# Patient Record
Sex: MALE | Race: BLACK OR AFRICAN AMERICAN | URBAN - METROPOLITAN AREA
[De-identification: names, ages, dates, MRNs, and addresses within clinical notes are randomized per-mention and may not be internally consistent; named-entity substitution may affect disease eponyms.]

---

## 2018-02-05 ENCOUNTER — OFFICE VISIT (OUTPATIENT)
Dept: FAMILY MEDICINE CLINIC | Age: 43
End: 2018-02-05

## 2018-02-05 VITALS
DIASTOLIC BLOOD PRESSURE: 87 MMHG | HEIGHT: 72 IN | WEIGHT: 271 LBS | TEMPERATURE: 98.7 F | OXYGEN SATURATION: 98 % | SYSTOLIC BLOOD PRESSURE: 154 MMHG | RESPIRATION RATE: 17 BRPM | BODY MASS INDEX: 36.7 KG/M2 | HEART RATE: 88 BPM

## 2018-02-05 DIAGNOSIS — Z29.9 PREVENTIVE MEASURE: ICD-10-CM

## 2018-02-05 DIAGNOSIS — Z72.0 TOBACCO ABUSE: ICD-10-CM

## 2018-02-05 DIAGNOSIS — Z80.0 FAMILY HISTORY OF RECTAL CANCER: ICD-10-CM

## 2018-02-05 DIAGNOSIS — Z86.39 HISTORY OF HYPOGONADISM: ICD-10-CM

## 2018-02-05 DIAGNOSIS — E66.9 OBESITY, UNSPECIFIED CLASSIFICATION, UNSPECIFIED OBESITY TYPE, UNSPECIFIED WHETHER SERIOUS COMORBIDITY PRESENT: ICD-10-CM

## 2018-02-05 DIAGNOSIS — I10 ESSENTIAL HYPERTENSION: Primary | ICD-10-CM

## 2018-02-05 RX ORDER — LOSARTAN POTASSIUM 50 MG/1
50 TABLET ORAL DAILY
Qty: 30 TAB | Refills: 0 | Status: CANCELLED | OUTPATIENT
Start: 2018-02-05

## 2018-02-05 RX ORDER — LOSARTAN POTASSIUM 50 MG/1
TABLET ORAL DAILY
COMMUNITY

## 2018-02-05 NOTE — PROGRESS NOTES
Chief Complaint   Patient presents with    New Patient     1. Have you been to the ER, urgent care clinic since your last visit? Hospitalized since your last visit? No    2. Have you seen or consulted any other health care providers outside of the [address] [place] [address] since your last visit? Include any pap smears or colon screening.  No

## 2018-02-05 NOTE — PROGRESS NOTES
History and Physical    Patient: Michael Buck MRN: [id number]  SSN: [id number]    YOB: 1975  Age: 43 y.o. Sex: male      Subjective:      Michael Buck is a 43 y.o. male who is here to establish care. The patient has been out of touch with his PCP Dr. Wiliam Stark with [facility]. 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 He states that he was about 25years of age when he was diagnosed with hypertension. The patient was later prescribed losartan by his doctor. The patient mentions that he was not that consistent in taking his meds of late. The patient denies any headaches or blurred vision since being off of his medications. He also states that he never had any side effects from his medications. The patient mentions that he has been treated for low testosterone. He states that he was previously treated with Androgel. He states that is interested in starting back on testosterone. He states that he is constantly fatigued and has a problem concentrating. He states that the last time he had T therapy was around 2016. Past Medical History:   Diagnosis Date    Hypercholesterolemia     Hypertension      No past surgical history on file. Denies     Family History   Problem Relation Age of Onset    Cancer Father      Rectal (diagnosed 5-7 years ago)    [facility] Heart Disease Father     Heart Attack Father     Stroke Father     Hypertension Father     Hypertension Sister      Social History   Substance Use Topics    Smoking status: Current Every Day Smoker (3 cigarettes a day)    Smokeless tobacco: Never Used    Alcohol use 10.8 oz/week     18 Cans of beer per week (12-18 cans a week of alcohol)    -   - Denies illicit drugs   [facility]        Prior to Admission medications    Medication Sig Start Date End Date Taking? Authorizing Provider   losartan (COZAAR) 50 mg tablet Take  by mouth daily.     Historical Provider        No Known Allergies    Review of Systems:  Constitutional: negative  Eyes: negative  Ears, Nose, Mouth, Throat, and Face: negative  Respiratory: negative  Cardiovascular: negative  Gastrointestinal: negative  Genitourinary:positive for sexual problems  Integument/Breast: negative  Hematologic/Lymphatic: negative  Musculoskeletal:negative  Neurological: negative  Behavioral/Psychiatric: negative    Objective:     Vitals:    02/05/18 1522   BP: 154/87   Pulse: 88   Resp: 17   Temp: 98.7 °F (37.1 °C)   TempSrc: Oral   SpO2: 98%   Weight: 271 lb (122.9 kg)   Height: 6' (1.829 m)        Physical Exam:  GENERAL: alert, cooperative, no distress, appears stated age, mildly obese  EYE: conjunctivae/corneas clear. PERRL, EOM's intact. Fundi benign  LYMPHATIC: Cervical, supraclavicular, and axillary nodes normal.   THROAT & NECK: normal and no erythema or exudates noted. LUNG: clear to auscultation bilaterally  HEART: regular rate and rhythm, S1, S2 normal, no murmur, click, rub or gallop  ABDOMEN: soft, non-tender. Bowel sounds normal. No masses,  no organomegaly, abnormal findings:  obese  EXTREMITIES:  extremities normal, atraumatic, no cyanosis or edema  SKIN: Normal.  NEUROLOGIC: negative  PSYCHIATRIC: non focal      Labs:   Labs ordered as noted below     Assessment:     1.) Essential Hypertension: Patient was advised to watch diet and to keep track of his blood pressures. Goal is for BP at 130/80 or less    2.) Tobacco Abuse: Counseling provided to the patient about the risks of     3.) Family History of Rectal Cancer: CEA and  ordered. 4.) History of Hypogonadism: testosterone level ordered. 5.) Tobacco Abuse: Patient provided with education regarding the importance of quitting smoking.     6.) Preventive: Labs ordered as noted below. Patient will return in approximately 2 weeks for follow up.        Plan:     Orders Placed This Encounter    SED RATE (ESR)    CBC WITH AUTOMATED DIFF    METABOLIC PANEL, COMPREHENSIVE    LIPID PANEL    TSH 3RD GENERATION    T4, FREE    VITAMIN D, 25 HYDROXY    HEMOGLOBIN A1C WITH EAG    HIV 1/2 AG/AB, 4TH GENERATION,W RFLX CONFIRM    TESTOSTERONE, FREE & TOTAL    CEA        losartan (COZAAR) 50 mg tablet                 Signed By: Joe Espinoza DO     February 5, 2018

## 2018-02-05 NOTE — PATIENT INSTRUCTIONS
Please contact our office if you have any questions about your visit today. 1.) Please check blood pressures every day until I see you on February 22nd. 2.) Please get labs before next visit. You can walk in for this. 3.) Return on February 22nd and bring blood pressure log with you. DASH Diet: Care Instructions  Your Care Instructions    The DASH diet is an eating plan that can help lower your blood pressure. DASH stands for Dietary Approaches to Stop Hypertension. Hypertension is high blood pressure. The DASH diet focuses on eating foods that are high in calcium, potassium, and magnesium. These nutrients can lower blood pressure. The foods that are highest in these nutrients are fruits, vegetables, low-fat dairy products, nuts, seeds, and legumes. But taking calcium, potassium, and magnesium supplements instead of eating foods that are high in those nutrients does not have the same effect. The DASH diet also includes whole grains, fish, and poultry. The DASH diet is one of several lifestyle changes your doctor may recommend to lower your high blood pressure. Your doctor may also want you to decrease the amount of sodium in your diet. Lowering sodium while following the DASH diet can lower blood pressure even further than just the DASH diet alone. Follow-up care is a key part of your treatment and safety. Be sure to make and go to all appointments, and call your doctor if you are having problems. It's also a good idea to know your test results and keep a list of the medicines you take. How can you care for yourself at home? Following the DASH diet  · Eat 4 to 5 servings of fruit each day. A serving is 1 medium-sized piece of fruit, ½ cup chopped or canned fruit, 1/4 cup dried fruit, or 4 ounces (½ cup) of fruit juice. Choose fruit more often than fruit juice. · Eat 4 to 5 servings of vegetables each day.  A serving is 1 cup of lettuce or raw leafy vegetables, ½ cup of chopped or cooked vegetables, or 4 ounces (½ cup) of vegetable juice. Choose vegetables more often than vegetable juice. · Get 2 to 3 servings of low-fat and fat-free dairy each day. A serving is 8 ounces of milk, 1 cup of yogurt, or 1 ½ ounces of cheese. · Eat 6 to 8 servings of grains each day. A serving is 1 slice of bread, 1 ounce of dry cereal, or ½ cup of cooked rice, pasta, or cooked cereal. Try to choose whole-grain products as much as possible. · Limit lean meat, poultry, and fish to 2 servings each day. A serving is 3 ounces, about the size of a deck of cards. · Eat 4 to 5 servings of nuts, seeds, and legumes (cooked dried beans, lentils, and split peas) each week. A serving is 1/3 cup of nuts, 2 tablespoons of seeds, or ½ cup of cooked beans or peas. · Limit fats and oils to 2 to 3 servings each day. A serving is 1 teaspoon of vegetable oil or 2 tablespoons of salad dressing. · Limit sweets and added sugars to 5 servings or less a week. A serving is 1 tablespoon jelly or jam, ½ cup sorbet, or 1 cup of lemonade. · Eat less than 2,300 milligrams (mg) of sodium a day. If you limit your sodium to 1,500 mg a day, you can lower your blood pressure even more. Tips for success  · Start small. Do not try to make dramatic changes to your diet all at once. You might feel that you are missing out on your favorite foods and then be more likely to not follow the plan. Make small changes, and stick with them. Once those changes become habit, add a few more changes. · Try some of the following:  ¨ Make it a goal to eat a fruit or vegetable at every meal and at snacks. This will make it easy to get the recommended amount of fruits and vegetables each day. ¨ Try yogurt topped with fruit and nuts for a snack or healthy dessert. ¨ Add lettuce, tomato, cucumber, and onion to sandwiches. ¨ Combine a ready-made pizza crust with low-fat mozzarella cheese and lots of vegetable toppings.  Try using tomatoes, squash, spinach, broccoli, carrots, cauliflower, and onions. ¨ Have a variety of cut-up vegetables with a low-fat dip as an appetizer instead of chips and dip. ¨ Sprinkle sunflower seeds or chopped almonds over salads. Or try adding chopped walnuts or almonds to cooked vegetables. ¨ Try some vegetarian meals using beans and peas. Add garbanzo or kidney beans to salads. Make burritos and tacos with mashed alegre beans or black beans. Where can you learn more? Go to http://[employer]rosita.info/. Enter I473 in the search box to learn more about \"DASH Diet: Care Instructions. \"  Current as of: September 21, 2016  Content Version: 11.4  © 7944-8691 [employer]. Care instructions adapted under license by [employer] (which disclaims liability or warranty for this information). If you have questions about a medical condition or this instruction, always ask your healthcare professional. [place] [address] any warranty or liability for your use of this information. Learning About Benefits From Quitting Smoking  How does quitting smoking make you healthier? If you're thinking about quitting smoking, you may have a few reasons to be smoke-free. Your health may be one of them. · When you quit smoking, you lower your risks for cancer, lung disease, heart attack, stroke, blood vessel disease, and blindness from macular degeneration. · When you're smoke-free, you get sick less often, and you heal faster. You are less likely to get colds, flu, bronchitis, and pneumonia. · As a nonsmoker, you may find that your mood is better and you are less stressed. When and how will you feel healthier? Quitting has real health benefits that start from day 1 of being smoke-free. And the longer you stay smoke-free, the healthier you get and the better you feel. The first hours  · After just 20 minutes, your blood pressure and heart rate go down.  That means there's less stress on your heart and blood vessels. · Within 12 hours, the level of carbon monoxide in your blood drops back to normal. That makes room for more oxygen. With more oxygen in your body, you may notice that you have more energy than when you smoked. After 2 weeks  · Your lungs start to work better. · Your risk of heart attack starts to drop. After 1 month  · When your lungs are clear, you cough less and breathe deeper, so it's easier to be active. · Your sense of taste and smell return. That means you can enjoy food more than you have since you started smoking. Over the years  · After 1 year, your risk of heart disease is half what it would be if you kept smoking. · After 5 years, your risk of stroke starts to shrink. Within a few years after that, it's about the same as if you'd never smoked. · After 10 years, your risk of dying from lung cancer is cut by about half. And your risk for many other types of cancer is lower too. How would quitting help others in your life? When you quit smoking, you improve the health of everyone who now breathes in your smoke. · Their heart, lung, and cancer risks drop, much like yours. · They are sick less. For babies and small children, living smoke-free means they're less likely to have ear infections, pneumonia, and bronchitis. · If you're a woman who is or will be pregnant someday, quitting smoking means a healthier . · Children who are close to you are less likely to become adult smokers. Where can you learn more? Go to http://yonatan-rosita.info/. Enter 052 [id number] 72 11 in the search box to learn more about \"Learning About Benefits From Quitting Smoking. \"  Current as of: 2017  Content Version: 11.4  © 9430-9915 [employer]. Care instructions adapted under license by [employer] (which disclaims liability or warranty for this information).  If you have questions about a medical condition or this instruction, always ask your healthcare professional. [address] any warranty or liability for your use of this information.

## 2018-02-05 NOTE — MR AVS SNAPSHOT
[facility] 
 
 
 [address] [address] [address] [address] [phone] Patient: Moshe Guerrero MRN: [id number] :1975 Visit Information Date & Time Provider Department Dept. Phone Encounter #  
 2018  3:00 PM [facility] [id number] Follow-up Instructions Return in about 2 weeks (around 2018) for Schedule for 3 pm appointment . Upcoming Health Maintenance Date Due DTaP/Tdap/Td series (1 - Tdap) 1996 Influenza Age 5 to Adult 2017 Allergies as of 2018  Review Complete On: 2018 By: Sara Meza DO No Known Allergies Current Immunizations  Never Reviewed No immunizations on file. Not reviewed this visit You Were Diagnosed With   
  
 Codes Comments Essential hypertension    -  Primary ICD-10-CM: I10 
ICD-9-CM: 401.9 Tobacco abuse     ICD-10-CM: Z72.0 ICD-9-CM: 305.1 Obesity, unspecified classification, unspecified obesity type, unspecified whether serious comorbidity present     ICD-10-CM: E66.9 ICD-9-CM: 278.00 Preventive measure     ICD-10-CM: Z29.9 ICD-9-CM: V07.9 Vitals BP Pulse Temp Resp Height(growth percentile) Weight(growth percentile) 154/87 (BP 1 Location: Right arm, BP Patient Position: Sitting) 88 98.7 °F (37.1 °C) (Oral) 17 6' (1.829 m) 271 lb (122.9 kg) SpO2 BMI Smoking Status 98% 36.75 kg/m2 Current Every Day Smoker BMI and BSA Data Body Mass Index Body Surface Area 36.75 kg/m 2 2.5 m 2 Preferred Pharmacy Pharmacy Name Phone [address] [phone] Your Updated Medication List  
  
   
This list is accurate as of: 18  3:57 PM.  Always use your most recent med list.  
  
  
  
  
 losartan 50 mg tablet Commonly known as:  COZAAR Take  by mouth daily. Follow-up Instructions Return in about 2 weeks (around 2/22/2018) for Schedule for 3 pm appointment . To-Do List   
 02/05/2018 Lab:  HEMOGLOBIN A1C WITH EAG   
  
 02/05/2018 Lab:  HIV 1/2 AG/AB, 4TH GENERATION,W RFLX CONFIRM   
  
 02/05/2018 Lab:  VITAMIN D, 25 HYDROXY Around 05/06/2018 Lab:  CBC WITH AUTOMATED DIFF Around 05/06/2018 Lab:  LIPID PANEL Around 05/06/2018 Lab:  METABOLIC PANEL, COMPREHENSIVE Around 05/06/2018 Lab:  SED RATE (ESR) Around 05/06/2018 Lab:  T4, FREE Around 05/06/2018 Lab:  TSH 3RD GENERATION Patient Instructions Please contact our office if you have any questions about your visit today. 1.) Please check blood pressures every day until I see you on February 22nd. 2.) Please get labs before next visit. You can walk in for this. 3.) Return on February 22nd and bring blood pressure log with you. DASH Diet: Care Instructions Your Care Instructions The DASH diet is an eating plan that can help lower your blood pressure. DASH stands for Dietary Approaches to Stop Hypertension. Hypertension is high blood pressure. The DASH diet focuses on eating foods that are high in calcium, potassium, and magnesium. These nutrients can lower blood pressure. The foods that are highest in these nutrients are fruits, vegetables, low-fat dairy products, nuts, seeds, and legumes. But taking calcium, potassium, and magnesium supplements instead of eating foods that are high in those nutrients does not have the same effect. The DASH diet also includes whole grains, fish, and poultry. The DASH diet is one of several lifestyle changes your doctor may recommend to lower your high blood pressure. Your doctor may also want you to decrease the amount of sodium in your diet. Lowering sodium while following the DASH diet can lower blood pressure even further than just the DASH diet alone. Follow-up care is a key part of your treatment and safety. Be sure to make and go to all appointments, and call your doctor if you are having problems. It's also a good idea to know your test results and keep a list of the medicines you take. How can you care for yourself at home? Following the DASH diet · Eat 4 to 5 servings of fruit each day. A serving is 1 medium-sized piece of fruit, ½ cup chopped or canned fruit, 1/4 cup dried fruit, or 4 ounces (½ cup) of fruit juice. Choose fruit more often than fruit juice. · Eat 4 to 5 servings of vegetables each day. A serving is 1 cup of lettuce or raw leafy vegetables, ½ cup of chopped or cooked vegetables, or 4 ounces (½ cup) of vegetable juice. Choose vegetables more often than vegetable juice. · Get 2 to 3 servings of low-fat and fat-free dairy each day. A serving is 8 ounces of milk, 1 cup of yogurt, or 1 ½ ounces of cheese. · Eat 6 to 8 servings of grains each day. A serving is 1 slice of bread, 1 ounce of dry cereal, or ½ cup of cooked rice, pasta, or cooked cereal. Try to choose whole-grain products as much as possible. · Limit lean meat, poultry, and fish to 2 servings each day. A serving is 3 ounces, about the size of a deck of cards. · Eat 4 to 5 servings of nuts, seeds, and legumes (cooked dried beans, lentils, and split peas) each week. A serving is 1/3 cup of nuts, 2 tablespoons of seeds, or ½ cup of cooked beans or peas. · Limit fats and oils to 2 to 3 servings each day. A serving is 1 teaspoon of vegetable oil or 2 tablespoons of salad dressing. · Limit sweets and added sugars to 5 servings or less a week. A serving is 1 tablespoon jelly or jam, ½ cup sorbet, or 1 cup of lemonade. · Eat less than 2,300 milligrams (mg) of sodium a day. If you limit your sodium to 1,500 mg a day, you can lower your blood pressure even more. Tips for success · Start small.  Do not try to make dramatic changes to your diet all at once. You might feel that you are missing out on your favorite foods and then be more likely to not follow the plan. Make small changes, and stick with them. Once those changes become habit, add a few more changes. · Try some of the following: ¨ Make it a goal to eat a fruit or vegetable at every meal and at snacks. This will make it easy to get the recommended amount of fruits and vegetables each day. ¨ Try yogurt topped with fruit and nuts for a snack or healthy dessert. ¨ Add lettuce, tomato, cucumber, and onion to sandwiches. ¨ Combine a ready-made pizza crust with low-fat mozzarella cheese and lots of vegetable toppings. Try using tomatoes, squash, spinach, broccoli, carrots, cauliflower, and onions. ¨ Have a variety of cut-up vegetables with a low-fat dip as an appetizer instead of chips and dip. ¨ Sprinkle sunflower seeds or chopped almonds over salads. Or try adding chopped walnuts or almonds to cooked vegetables. ¨ Try some vegetarian meals using beans and peas. Add garbanzo or kidney beans to salads. Make burritos and tacos with mashed alegre beans or black beans. Where can you learn more? Go to http://yonatan-rosita.info/. Enter C633 in the search box to learn more about \"DASH Diet: Care Instructions. \" Current as of: September 21, 2016 Content Version: 11.4 © 2416-4255 ColorModules. Care instructions adapted under license by Dynamix[employer] (which disclaims liability or warranty for this information). If you have questions about a medical condition or this instruction, always ask your healthcare professional. [place] [address] any warranty or liability for your use of this information. Learning About Benefits From Quitting Smoking How does quitting smoking make you healthier? If you're thinking about quitting smoking, you may have a few reasons to be smoke-free. Your health may be one of them. · When you quit smoking, you lower your risks for cancer, lung disease, heart attack, stroke, blood vessel disease, and blindness from macular degeneration. · When you're smoke-free, you get sick less often, and you heal faster. You are less likely to get colds, flu, bronchitis, and pneumonia. · As a nonsmoker, you may find that your mood is better and you are less stressed. When and how will you feel healthier? Quitting has real health benefits that start from day 1 of being smoke-free. And the longer you stay smoke-free, the healthier you get and the better you feel. The first hours · After just 20 minutes, your blood pressure and heart rate go down. That means there's less stress on your heart and blood vessels. · Within 12 hours, the level of carbon monoxide in your blood drops back to normal. That makes room for more oxygen. With more oxygen in your body, you may notice that you have more energy than when you smoked. After 2 weeks · Your lungs start to work better. · Your risk of heart attack starts to drop. After 1 month · When your lungs are clear, you cough less and breathe deeper, so it's easier to be active. · Your sense of taste and smell return. That means you can enjoy food more than you have since you started smoking. Over the years · After 1 year, your risk of heart disease is half what it would be if you kept smoking. · After 5 years, your risk of stroke starts to shrink. Within a few years after that, it's about the same as if you'd never smoked. · After 10 years, your risk of dying from lung cancer is cut by about half. And your risk for many other types of cancer is lower too. How would quitting help others in your life? When you quit smoking, you improve the health of everyone who now breathes in your smoke. · Their heart, lung, and cancer risks drop, much like yours. · They are sick less.  For babies and small children, living smoke-free means they're less likely to have ear infections, pneumonia, and bronchitis. · If you're a woman who is or will be pregnant someday, quitting smoking means a healthier . · Children who are close to you are less likely to become adult smokers. Where can you learn more? Go to http://yonatan-rosita.info/. Enter 052 [id number] 72 11 in the search box to learn more about \"Learning About Benefits From Quitting Smoking. \" Current as of: 2017 Content Version: 11.4 © 6937-8388 InSphero. Care instructions adapted under license by Bestcake (which disclaims liability or warranty for this information). If you have questions about a medical condition or this instruction, always ask your healthcare professional. [address] any warranty or liability for your use of this information. Introducing [facility]! Rita Thomas introduces Panraven patient portal. Now you can access parts of your medical record, email your doctor's office, and request medication refills online. 1. In your internet browser, go to [email or web address] 2. Click on the First Time User? Click Here link in the Sign In box. You will see the New Member Sign Up page. 3. Enter your Panraven Access Code exactly as it appears below. You will not need to use this code after youve completed the sign-up process. If you do not sign up before the expiration date, you must request a new code. · Panraven Access Code: [email or web address] Expires: 2018  3:57 PM 
 
4. Enter the last four digits of your Social Security Number (xxxx) and Date of Birth (mm/dd/yyyy) as indicated and click Submit. You will be taken to the next sign-up page. 5. Create a DySISmedicalt ID. This will be your Panraven login ID and cannot be changed, so think of one that is secure and easy to remember. 6. Create a Panraven password. You can change your password at any time. 7. Enter your Password Reset Question and Answer. This can be used at a later time if you forget your password. 8. Enter your e-mail address. You will receive e-mail notification when new information is available in [address] 9. Click Sign Up. You can now view and download portions of your medical record. 10. Click the Download Summary menu link to download a portable copy of your medical information. If you have questions, please visit the Frequently Asked Questions section of the Convio website. Remember, Convio is NOT to be used for urgent needs. For medical emergencies, dial 911. Now available from your iPhone and Android! Please provide this summary of care documentation to your next provider. Your primary care clinician is listed as Pao Ch. If you have any questions after today's visit, please call [phone].

## 2018-02-06 ENCOUNTER — HOSPITAL ENCOUNTER (OUTPATIENT)
Dept: LAB | Age: 43
Discharge: HOME | End: 2018-02-06
Payer: COMMERCIAL

## 2018-02-06 DIAGNOSIS — I10 ESSENTIAL HYPERTENSION: ICD-10-CM

## 2018-02-06 DIAGNOSIS — Z72.0 TOBACCO ABUSE: ICD-10-CM

## 2018-02-06 DIAGNOSIS — Z80.0 FAMILY HISTORY OF RECTAL CANCER: ICD-10-CM

## 2018-02-06 DIAGNOSIS — Z29.9 PREVENTIVE MEASURE: ICD-10-CM

## 2018-02-06 DIAGNOSIS — E66.9 OBESITY, UNSPECIFIED CLASSIFICATION, UNSPECIFIED OBESITY TYPE, UNSPECIFIED WHETHER SERIOUS COMORBIDITY PRESENT: ICD-10-CM

## 2018-02-06 DIAGNOSIS — Z86.39 HISTORY OF HYPOGONADISM: ICD-10-CM

## 2018-02-06 LAB
ALBUMIN SERPL-MCNC: 4 G/DL (ref 3.4–5)
ALBUMIN/GLOB SERPL: 1 {RATIO} (ref 0.8–1.7)
ALP SERPL-CCNC: 51 U/L (ref 45–117)
ALT SERPL-CCNC: 38 U/L (ref 16–61)
ANION GAP SERPL CALC-SCNC: 6 MMOL/L (ref 3–18)
AST SERPL-CCNC: 26 U/L (ref 15–37)
BASOPHILS # BLD: 0 K/UL (ref 0–0.06)
BASOPHILS NFR BLD: 1 % (ref 0–2)
BILIRUB SERPL-MCNC: 0.3 MG/DL (ref 0.2–1)
BUN SERPL-MCNC: 14 MG/DL (ref 7–18)
BUN/CREAT SERPL: 13 (ref 12–20)
CALCIUM SERPL-MCNC: 9 MG/DL (ref 8.5–10.1)
CHLORIDE SERPL-SCNC: 106 MMOL/L (ref 100–108)
CHOLEST SERPL-MCNC: 249 MG/DL
CO2 SERPL-SCNC: 29 MMOL/L (ref 21–32)
CREAT SERPL-MCNC: 1.04 MG/DL (ref 0.6–1.3)
DIFFERENTIAL METHOD BLD: ABNORMAL
EOSINOPHIL # BLD: 0.1 K/UL (ref 0–0.4)
EOSINOPHIL NFR BLD: 2 % (ref 0–5)
ERYTHROCYTE [DISTWIDTH] IN BLOOD BY AUTOMATED COUNT: 12.9 % (ref 11.6–14.5)
ERYTHROCYTE [SEDIMENTATION RATE] IN BLOOD: 3 MM/HR (ref 0–15)
EST. AVERAGE GLUCOSE BLD GHB EST-MCNC: 114 MG/DL
GLOBULIN SER CALC-MCNC: 4.1 G/DL (ref 2–4)
GLUCOSE SERPL-MCNC: 94 MG/DL (ref 74–99)
HBA1C MFR BLD: 5.6 % (ref 4.2–5.6)
HCT VFR BLD AUTO: 46.1 % (ref 36–48)
HDLC SERPL-MCNC: 35 MG/DL (ref 40–60)
HDLC SERPL: 7.1 {RATIO} (ref 0–5)
HGB BLD-MCNC: 15.2 G/DL (ref 13–16)
LDLC SERPL CALC-MCNC: 158.2 MG/DL (ref 0–100)
LIPID PROFILE,FLP: ABNORMAL
LYMPHOCYTES # BLD: 2.2 K/UL (ref 0.9–3.6)
LYMPHOCYTES NFR BLD: 32 % (ref 21–52)
MCH RBC QN AUTO: 29.5 PG (ref 24–34)
MCHC RBC AUTO-ENTMCNC: 33 G/DL (ref 31–37)
MCV RBC AUTO: 89.5 FL (ref 74–97)
MONOCYTES # BLD: 0.7 K/UL (ref 0.05–1.2)
MONOCYTES NFR BLD: 11 % (ref 3–10)
NEUTS SEG # BLD: 3.9 K/UL (ref 1.8–8)
NEUTS SEG NFR BLD: 54 % (ref 40–73)
PLATELET # BLD AUTO: 338 K/UL (ref 135–420)
PMV BLD AUTO: 10.7 FL (ref 9.2–11.8)
POTASSIUM SERPL-SCNC: 4.5 MMOL/L (ref 3.5–5.5)
PROT SERPL-MCNC: 8.1 G/DL (ref 6.4–8.2)
RBC # BLD AUTO: 5.15 M/UL (ref 4.7–5.5)
SODIUM SERPL-SCNC: 141 MMOL/L (ref 136–145)
T4 FREE SERPL-MCNC: 1 NG/DL (ref 0.7–1.5)
TRIGL SERPL-MCNC: 279 MG/DL (ref ?–150)
TSH SERPL DL<=0.05 MIU/L-ACNC: 2.4 UIU/ML (ref 0.36–3.74)
VLDLC SERPL CALC-MCNC: 55.8 MG/DL
WBC # BLD AUTO: 7 K/UL (ref 4.6–13.2)

## 2018-02-06 PROCEDURE — 82306 VITAMIN D 25 HYDROXY: CPT | Performed by: INTERNAL MEDICINE

## 2018-02-06 PROCEDURE — 80061 LIPID PANEL: CPT | Performed by: INTERNAL MEDICINE

## 2018-02-06 PROCEDURE — 87389 HIV-1 AG W/HIV-1&-2 AB AG IA: CPT | Performed by: INTERNAL MEDICINE

## 2018-02-06 PROCEDURE — 86304 IMMUNOASSAY TUMOR CA 125: CPT | Performed by: INTERNAL MEDICINE

## 2018-02-06 PROCEDURE — 82378 CARCINOEMBRYONIC ANTIGEN: CPT | Performed by: INTERNAL MEDICINE

## 2018-02-06 PROCEDURE — 84403 ASSAY OF TOTAL TESTOSTERONE: CPT | Performed by: INTERNAL MEDICINE

## 2018-02-06 PROCEDURE — 85025 COMPLETE CBC W/AUTO DIFF WBC: CPT | Performed by: INTERNAL MEDICINE

## 2018-02-06 PROCEDURE — 84439 ASSAY OF FREE THYROXINE: CPT | Performed by: INTERNAL MEDICINE

## 2018-02-06 PROCEDURE — 80053 COMPREHEN METABOLIC PANEL: CPT | Performed by: INTERNAL MEDICINE

## 2018-02-06 PROCEDURE — 83036 HEMOGLOBIN GLYCOSYLATED A1C: CPT | Performed by: INTERNAL MEDICINE

## 2018-02-06 PROCEDURE — 36415 COLL VENOUS BLD VENIPUNCTURE: CPT | Performed by: INTERNAL MEDICINE

## 2018-02-06 PROCEDURE — 84443 ASSAY THYROID STIM HORMONE: CPT | Performed by: INTERNAL MEDICINE

## 2018-02-06 PROCEDURE — 85652 RBC SED RATE AUTOMATED: CPT | Performed by: INTERNAL MEDICINE

## 2018-02-07 LAB
25(OH)D3 SERPL-MCNC: 12.1 NG/ML (ref 30–100)
CANCER AG125 SERPL-ACNC: 8.2 U/ML
CEA SERPL-MCNC: <0.5 NG/ML
HIV 1+2 AB+HIV1 P24 AG SERPL QL IA: NONREACTIVE
HIV12 RESULT COMMENT, HHIVC: NORMAL
TESTOST FREE SERPL-MCNC: 7.5 PG/ML (ref 6.8–21.5)
TESTOST SERPL-MCNC: 218 NG/DL (ref 264–916)

## 2018-02-22 ENCOUNTER — OFFICE VISIT (OUTPATIENT)
Dept: FAMILY MEDICINE CLINIC | Age: 43
End: 2018-02-22

## 2018-02-22 VITALS
HEART RATE: 76 BPM | WEIGHT: 273 LBS | OXYGEN SATURATION: 99 % | RESPIRATION RATE: 17 BRPM | SYSTOLIC BLOOD PRESSURE: 143 MMHG | BODY MASS INDEX: 36.98 KG/M2 | TEMPERATURE: 99.8 F | HEIGHT: 72 IN | DIASTOLIC BLOOD PRESSURE: 84 MMHG

## 2018-02-22 DIAGNOSIS — E78.00 HYPERCHOLESTEROLEMIA: ICD-10-CM

## 2018-02-22 DIAGNOSIS — Z86.39 HISTORY OF HYPOGONADISM: ICD-10-CM

## 2018-02-22 DIAGNOSIS — I10 ESSENTIAL HYPERTENSION: Primary | ICD-10-CM

## 2018-02-22 DIAGNOSIS — E55.9 VITAMIN D DEFICIENCY: ICD-10-CM

## 2018-02-22 RX ORDER — PRAVASTATIN SODIUM 20 MG/1
20 TABLET ORAL
Qty: 30 TAB | Refills: 0 | Status: SHIPPED | OUTPATIENT
Start: 2018-02-22 | End: 2018-04-27 | Stop reason: SDUPTHER

## 2018-02-22 RX ORDER — ERGOCALCIFEROL 1.25 MG/1
50000 CAPSULE ORAL
Qty: 12 CAP | Refills: 3 | Status: SHIPPED | OUTPATIENT
Start: 2018-02-22

## 2018-02-22 NOTE — PROGRESS NOTES
Chief Complaint   Patient presents with    Follow-up     here for lab results     1. Have you been to the ER, urgent care clinic since your last visit? Hospitalized since your last visit? No    2. Have you seen or consulted any other health care providers outside of the [address] [place] [address] since your last visit? Include any pap smears or colon screening.  No

## 2018-02-22 NOTE — PROGRESS NOTES
History and Physical    Patient: Deisy Bonilla MRN: [id number]  SSN: [id number]    YOB: 1975  Age: 43 y.o. Sex: male      Subjective:      Deisy Bonilla is a 43 y.o. male who is here for follow up to review labs and to discuss his blood pressure readings. He mentions that he goes to the gym about 3-4 times a week. He has systolic BP readings that range from 814 systolic to 767 systolic. 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 He mentions that he gets about 4-6 hours of sleep a night. Visit from 2/5/2018  Patient is here to establish care. The patient has been out of touch with his PCP Dr. Katiana Downs with [facility]. 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 He states that he was about 25years of age when he was diagnosed with hypertension. The patient was later prescribed losartan by his doctor. The patient mentions that he was not that consistent in taking his meds of late. The patient denies any headaches or blurred vision since being off of his medications. He also states that he never had any side effects from his medications. The patient mentions that he has been treated for low testosterone. He states that he was previously treated with Androgel. He states that is interested in starting back on testosterone. He states that he is constantly fatigued and has a problem concentrating. He states that the last time he had T therapy was around 2016. Past Medical History:   Diagnosis Date    Hypercholesterolemia     Hypertension      No past surgical history on file.      Denies     Family History   Problem Relation Age of Onset    Cancer Father      Rectal (diagnosed 5-7 years ago)    [facility] Heart Disease Father     Heart Attack Father     Stroke Father     Hypertension Father     Hypertension Sister      Social History   Substance Use Topics    Smoking status: Current Every Day Smoker (3 cigarettes a day)    Smokeless tobacco: Never Used    Alcohol use 10.8 oz/week     18 Cans of beer per week (12-18 cans a week of alcohol)    -   - Denies illicit drugs   - [employer] (12-13 years)        Prior to Admission medications    Medication Sig Start Date End Date Taking? Authorizing Provider   losartan (COZAAR) 50 mg tablet Take  by mouth daily. Historical Provider        No Known Allergies    Review of Systems:  Pertinent ROS noted in HPI     Objective:     Visit Vitals    /84 (BP 1 Location: Right arm, BP Patient Position: Sitting)    Pulse 76    Temp 99.8 °F (37.7 °C) (Oral)    Resp 17    Ht 6' (1.829 m)    Wt 273 lb (123.8 kg)    SpO2 99%    BMI 37.03 kg/m2     Physical Exam:  GENERAL: alert, cooperative, no distress, appears stated age, mildly obese  EYE: conjunctivae/corneas clear. PERRL, EOM's intact. Fundi benign  LYMPHATIC: Cervical, supraclavicular, and axillary nodes normal.   THROAT & NECK: normal and no erythema or exudates noted. LUNG: clear to auscultation bilaterally  HEART: regular rate and rhythm, S1, S2 normal, no murmur, click, rub or gallop  ABDOMEN: soft, non-tender.  Bowel sounds normal. No masses,  no organomegaly, abnormal findings:  obese  EXTREMITIES:  extremities normal, atraumatic, no cyanosis or edema  SKIN: Normal.      Labs:     Lab Results   Component Value Date/Time    WBC 7.0 02/06/2018 08:26 AM    HGB 15.2 02/06/2018 08:26 AM    HCT 46.1 02/06/2018 08:26 AM    PLATELET 607 36/91/2779 08:26 AM    MCV 89.5 02/06/2018 08:26 AM     Lab Results   Component Value Date/Time    Sodium 141 02/06/2018 08:26 AM    Potassium 4.5 02/06/2018 08:26 AM    Chloride 106 02/06/2018 08:26 AM    CO2 29 02/06/2018 08:26 AM    Anion gap 6 02/06/2018 08:26 AM    Glucose 94 02/06/2018 08:26 AM    BUN 14 02/06/2018 08:26 AM    Creatinine 1.04 02/06/2018 08:26 AM    BUN/Creatinine ratio 13 02/06/2018 08:26 AM    GFR est AA >60 02/06/2018 08:26 AM    GFR est non-AA >60 02/06/2018 08:26 AM    Calcium 9.0 02/06/2018 08:26 AM    Bilirubin, total 0.3 02/06/2018 08:26 AM    AST (SGOT) 26 02/06/2018 08:26 AM    Alk. phosphatase 51 02/06/2018 08:26 AM    Protein, total 8.1 02/06/2018 08:26 AM    Albumin 4.0 02/06/2018 08:26 AM    Globulin 4.1 (H) 02/06/2018 08:26 AM    A-G Ratio 1.0 02/06/2018 08:26 AM    ALT (SGPT) 38 02/06/2018 08:26 AM     Lab Results   Component Value Date/Time    TSH 2.40 02/06/2018 08:26 AM     Lab Results   Component Value Date/Time    Cholesterol, total 249 (H) 02/06/2018 08:26 AM    HDL Cholesterol 35 (L) 02/06/2018 08:26 AM    LDL, calculated 158.2 (H) 02/06/2018 08:26 AM    VLDL, calculated 55.8 02/06/2018 08:26 AM    Triglyceride 279 (H) 02/06/2018 08:26 AM    CHOL/HDL Ratio 7.1 (H) 02/06/2018 08:26 AM     Lab Results   Component Value Date/Time    CEA <0.5 02/06/2018 08:26 AM             Assessment:     1.) Essential Hypertension: Patient was advised to continue to keep track of his blood pressures. Goal is for BP at 130/80 or less    2.) Hypercholesterolemia: Patient placed on Pravastatin     3.) Family History of Rectal Cancer: CEA and  results were normal.       4.) History of Hypogonadism: Initial labs were low. I will reorder testosterone levels. If there is still a low level below 300 then I will start him on testosterone replacement therapy. 5.) Vitamin D Deficiency: Patient placed on Vitamin D 50,000 units per week. 6.) Preventive: Labs ordered as noted below. I personally reviewed and summarized all labs with the patient. Patient will return in approximately 3 weeks for follow up on medications and lab results.        Plan:     Orders Placed This Encounter    TESTOSTERONE, FREE & TOTAL    ergocalciferol (ERGOCALCIFEROL) 50,000 unit capsule    pravastatin (PRAVACHOL) 20 mg tablet               Signed By: Cait Pelaez DO     February 22, 2018

## 2018-02-22 NOTE — MR AVS SNAPSHOT
Derick Reyes 
 
 
 [address] [address] [address] [address] [phone] Patient: Elizebeth Fothergill MRN: [id number] :1975 Visit Information Date & Time Provider Department Dept. Phone Encounter #  
 2018  3:00 PM [id number] [id number] Follow-up Instructions Return in about 3 weeks (around 3/15/2018) for Follow up on blood pressures and labs . Upcoming Health Maintenance Date Due Pneumococcal 19-64 Medium Risk (1 of 1 - PPSV23) 1994 DTaP/Tdap/Td series (1 - Tdap) 1996 Influenza Age 5 to Adult 2017 Allergies as of 2018  Review Complete On:  By: Larisa Belcher. Cathi Joaquin LPN No Known Allergies Current Immunizations  Never Reviewed No immunizations on file. Not reviewed this visit You Were Diagnosed With   
  
 Codes Comments Essential hypertension    -  Primary ICD-10-CM: I10 
ICD-9-CM: 401.9 History of hypogonadism     ICD-10-CM: Z86.39 
ICD-9-CM: V12.29 Hypercholesterolemia     ICD-10-CM: E78.00 ICD-9-CM: 272.0 Vitamin D deficiency     ICD-10-CM: E55.9 ICD-9-CM: 268.9 Vitals BP Pulse Temp Resp Height(growth percentile) Weight(growth percentile) 143/84 (BP 1 Location: Right arm, BP Patient Position: Sitting) 76 99.8 °F (37.7 °C) (Oral) 17 6' (1.829 m) 273 lb (123.8 kg) SpO2 BMI Smoking Status 99% 37.03 kg/m2 Current Every Day Smoker BMI and BSA Data Body Mass Index Body Surface Area  
 37.03 kg/m 2 2.51 m 2 Preferred Pharmacy Pharmacy Name Phone [address] [place] [address] [phone] Your Updated Medication List  
  
   
This list is accurate as of 18  3:37 PM.  Always use your most recent med list.  
  
  
  
  
 ergocalciferol 50,000 unit capsule Commonly known as:  ERGOCALCIFEROL Take 1 Cap by mouth every seven (7) days. Indications: VITAMIN D DEFICIENCY (HIGH DOSE THERAPY)  
  
 losartan 50 mg tablet Commonly known as:  COZAAR Take  by mouth daily. pravastatin 20 mg tablet Commonly known as:  PRAVACHOL Take 1 Tab by mouth nightly. Indications: hypercholesterolemia Prescriptions Sent to Pharmacy Refills  
 ergocalciferol (ERGOCALCIFEROL) 50,000 unit capsule 3 Sig: Take 1 Cap by mouth every seven (7) days. Indications: VITAMIN D DEFICIENCY (HIGH DOSE THERAPY) Class: Normal  
 Pharmacy: [address] Ph #: [phone] Route: Oral  
 pravastatin (PRAVACHOL) 20 mg tablet 0 Sig: Take 1 Tab by mouth nightly. Indications: hypercholesterolemia Class: Normal  
 Pharmacy: [address] [facility] [address] Ph #: [phone] Route: Oral  
  
Follow-up Instructions Return in about 3 weeks (around 3/15/2018) for Follow up on blood pressures and labs . To-Do List   
 02/22/2018 Lab:  TESTOSTERONE, FREE & TOTAL Patient Instructions Please contact our office if you have any questions about your visit today. 1.) Start on Pravastatin for cholesterol. 2.) Come back next week for the second testosterone test.  
 
3.) Use Vitamin D once a week for Vitamin D deficiency. 4.) Return in 3 weeks for follow up. Try and get the OMRON version of the blood pressure machine. Try and get a large cuff machine. Statins: Care Instructions Your Care Instructions Statins are medicines that lower your cholesterol and your risk for a heart attack and stroke. Cholesterol is a type of fat in your blood. If you have too much cholesterol, it can build up in blood vessels. This raises your risk of heart disease, heart attack, and stroke. Statins lower cholesterol by blocking how much your body makes.  This prevents cholesterol from building up in your blood vessels. This is called hardening of the arteries. It is the starting point for some heart and blood flow problems, such as heart disease. Statins may also reduce inflammation around the buildup (called plaque). This can lower the risk that the plaque will break apart and lead to a heart attack or stroke. A heart-healthy lifestyle is important for lowering your risk whether you take statins or not. This includes eating healthy foods, being active, staying at a healthy weight, and not smoking. You must take statins regularly for them to work well. If you stop, your cholesterol and your risk will go back up. Examples of statins include: · Atorvastatin (Lipitor). · Lovastatin (Mevacor). · Pravastatin (Pravachol). · Simvastatin (Zocor). Statins interact with many medicines. So tell your doctor all of the other medicines that you take. These include prescription medicines, over-the-counter medicines, dietary supplements, and herbal products. Follow-up care is a key part of your treatment and safety. Be sure to make and go to all appointments, and call your doctor if you are having problems. It's also a good idea to know your test results and keep a list of the medicines you take. How can you care for yourself at home? · Take statins exactly as your doctor tells you. High cholesterol has no symptoms. So it is easy to forget to take the pills. Try to make a system that reminds you to take them. · Do not take two or more medicines at the same time unless the doctor told you to. Statins can interact with other medicines. · Always tell your doctor if you think you are having a side effect. If side effects are a problem with one medicine, a different one may be used. · Keep making the lifestyle changes your doctor suggests. Eat heart-healthy foods, be active, don't smoke, and stay at a healthy weight. · Talk to your doctor about avoiding grapefruit juice if you take statins. Grapefruit juice can raise the level of this medicine in your blood. This could increase side effects. When should you call for help? Watch closely for changes in your health, and be sure to contact your doctor if: 
? · You think you are having problems with your medicine. ? · You have aches or muscle pain. Where can you learn more? Go to http://yonatan-rosita.info/. Enter R358 in the search box to learn more about \"Statins: Care Instructions. \" Current as of: September 21, 2016 Content Version: 11.4 © 3687-0711 [employer]. Care instructions adapted under license by [employer] (which disclaims liability or warranty for this information). If you have questions about a medical condition or this instruction, always ask your healthcare professional. [address] any warranty or liability for your use of this information. Introducing [facility]! [facility] introduces Saber Seven patient portal. Now you can access parts of your medical record, email your doctor's office, and request medication refills online. 1. In your internet browser, go to [email or web address] 2. Click on the First Time User? Click Here link in the Sign In box. You will see the New Member Sign Up page. 3. Enter your Saber Seven Access Code exactly as it appears below. You will not need to use this code after youve completed the sign-up process. If you do not sign up before the expiration date, you must request a new code. · Saber Seven Access Code: [email or web address] Expires: 5/6/2018  3:57 PM 
 
4. Enter the last four digits of your Social Security Number (xxxx) and Date of Birth (mm/dd/yyyy) as indicated and click Submit. You will be taken to the next sign-up page. 5. Create a Saber Seven ID.  This will be your Saber Seven login ID and cannot be changed, so think of one that is secure and easy to remember. 6. Create a Gini & Jony password. You can change your password at any time. 7. Enter your Password Reset Question and Answer. This can be used at a later time if you forget your password. 8. Enter your e-mail address. You will receive e-mail notification when new information is available in [address] 9. Click Sign Up. You can now view and download portions of your medical record. 10. Click the Download Summary menu link to download a portable copy of your medical information. If you have questions, please visit the Frequently Asked Questions section of the Gini [employer] Jony website. Remember, Gini [employer] Jony is NOT to be used for urgent needs. For medical emergencies, dial 911. Now available from your iPhone and Android! Please provide this summary of care documentation to your next provider. Your primary care clinician is listed as [phone] Ch. If you have any questions after today's visit, please call [phone].

## 2018-02-27 ENCOUNTER — HOSPITAL ENCOUNTER (OUTPATIENT)
Dept: LAB | Age: 43
Discharge: HOME | End: 2018-02-27
Payer: COMMERCIAL

## 2018-02-27 DIAGNOSIS — Z86.39 HISTORY OF HYPOGONADISM: ICD-10-CM

## 2018-02-27 PROCEDURE — 36415 COLL VENOUS BLD VENIPUNCTURE: CPT | Performed by: INTERNAL MEDICINE

## 2018-02-27 PROCEDURE — 84403 ASSAY OF TOTAL TESTOSTERONE: CPT | Performed by: INTERNAL MEDICINE

## 2018-02-28 ENCOUNTER — TELEPHONE (OUTPATIENT)
Dept: FAMILY MEDICINE CLINIC | Age: 43
End: 2018-02-28

## 2018-02-28 DIAGNOSIS — N52.9 ERECTILE DYSFUNCTION, UNSPECIFIED ERECTILE DYSFUNCTION TYPE: Primary | ICD-10-CM

## 2018-02-28 LAB
TESTOST FREE SERPL-MCNC: 12.3 PG/ML (ref 6.8–21.5)
TESTOST SERPL-MCNC: 297 NG/DL (ref 264–916)

## 2018-02-28 NOTE — TELEPHONE ENCOUNTER
Pt called and stated he would like to speak with provider about new mediation. Pt would not given reason as to what medication or why. Please advise.

## 2018-03-07 NOTE — TELEPHONE ENCOUNTER
Haris Reynolds,   Please see if the patient prefers the generic version of Sildenafil or the branded. If he does generic it will be cheaper but he would have to take more pills for the same effect. Thanks.     ZEYNEP

## 2018-03-13 RX ORDER — SILDENAFIL 50 MG/1
50 TABLET, FILM COATED ORAL AS NEEDED
Qty: 6 TAB | Refills: 1 | Status: SHIPPED | OUTPATIENT
Start: 2018-03-13

## 2018-03-13 NOTE — TELEPHONE ENCOUNTER
Hi April,   Please let the patient know that the Viagra is in the system. He can pick it up at his pharmacy. Thanks.     ZEYNEP

## 2018-03-19 ENCOUNTER — OFFICE VISIT (OUTPATIENT)
Dept: FAMILY MEDICINE CLINIC | Age: 43
End: 2018-03-19

## 2018-03-19 VITALS
HEART RATE: 72 BPM | HEIGHT: 72 IN | DIASTOLIC BLOOD PRESSURE: 96 MMHG | WEIGHT: 272 LBS | RESPIRATION RATE: 18 BRPM | TEMPERATURE: 98.2 F | BODY MASS INDEX: 36.84 KG/M2 | SYSTOLIC BLOOD PRESSURE: 145 MMHG | OXYGEN SATURATION: 97 %

## 2018-03-19 DIAGNOSIS — Z79.899 MEDICATION MANAGEMENT: ICD-10-CM

## 2018-03-19 DIAGNOSIS — Z86.39 HISTORY OF HYPOGONADISM: ICD-10-CM

## 2018-03-19 DIAGNOSIS — I10 ESSENTIAL HYPERTENSION: Primary | ICD-10-CM

## 2018-03-19 RX ORDER — CHLORTHALIDONE 25 MG/1
25 TABLET ORAL DAILY
Qty: 30 TAB | Refills: 1 | Status: SHIPPED | OUTPATIENT
Start: 2018-03-19

## 2018-03-19 NOTE — MR AVS SNAPSHOT
[facility] 
 
 
 [address] [address] [address] [address] [phone] Patient: Jose Rafael Carias MRN: [id number] :1975 Visit Information Date & Time Provider Department Dept. Phone Encounter #  
 3/19/2018  3:45 PM [facility] [address] [id number] Follow-up Instructions Return in about 1 month (around 2018) for Follow up on new meds. Upcoming Health Maintenance Date Due Pneumococcal 19-64 Medium Risk (1 of 1 - PPSV23) 1994 DTaP/Tdap/Td series (1 - Tdap) 1996 Influenza Age 5 to Adult 2017 Allergies as of 3/19/2018  Review Complete On: 3/19/2018 By: Paula Do LPN No Known Allergies Current Immunizations  Never Reviewed No immunizations on file. Not reviewed this visit You Were Diagnosed With   
  
 Codes Comments Essential hypertension    -  Primary ICD-10-CM: I10 
ICD-9-CM: 401.9 History of hypogonadism     ICD-10-CM: Z86.39 
ICD-9-CM: V12.29 Medication management     ICD-10-CM: Z79.899 ICD-9-CM: V58.69 Vitals BP Pulse Temp Resp Height(growth percentile) Weight(growth percentile) (!) 145/96 (BP 1 Location: Left arm, BP Patient Position: Sitting) 72 98.2 °F (36.8 °C) (Oral) 18 6' (1.829 m) 272 lb (123.4 kg) SpO2 BMI Smoking Status 97% 36.89 kg/m2 Current Every Day Smoker BMI and BSA Data Body Mass Index Body Surface Area  
 36.89 kg/m 2 2.5 m 2 Preferred Pharmacy Pharmacy Name Phone [address] [phone] Your Updated Medication List  
  
   
This list is accurate as of 3/19/18  4:32 PM.  Always use your most recent med list.  
  
  
  
  
 chlorthalidone 25 mg tablet Commonly known as:  Bettinakylah Yates Take 1 Tab by mouth daily. ergocalciferol 50,000 unit capsule Commonly known as:  ERGOCALCIFEROL Take 1 Cap by mouth every seven (7) days. Indications: VITAMIN D DEFICIENCY (HIGH DOSE THERAPY)  
  
 losartan 50 mg tablet Commonly known as:  COZAAR Take  by mouth daily. pravastatin 20 mg tablet Commonly known as:  PRAVACHOL Take 1 Tab by mouth nightly. Indications: hypercholesterolemia  
  
 sildenafil citrate 50 mg tablet Commonly known as:  VIAGRA Take 1 Tab by mouth as needed. Prescriptions Sent to Pharmacy Refills  
 chlorthalidone (HYGROTEN) 25 mg tablet 1 Sig: Take 1 Tab by mouth daily. Class: Normal  
 Pharmacy: [facility] [address] [place] [address] Ph #: [phone] Route: Oral  
  
Follow-up Instructions Return in about 1 month (around 4/19/2018) for Follow up on new meds. To-Do List   
 03/19/2018 Lab:  PSA SCREENING (SCREENING) Patient Instructions 1.) Please get PSA before we start the Testosterone. If your numbers are fine I will start you on twice a week injectable testosterone. 2.) Use Chlorthalidone once a day for blood pressure. 3.) Return in 1 month for follow up. Chlorthalidone (By mouth) Chlorthalidone (klor-THAL-i-done) Treats high blood pressure and fluid retention (edema). This medicine is a diuretic (water pill). Brand Name(s):  
There may be other brand names for this medicine. When This Medicine Should Not Be Used: You should not use this medicine if you have had an allergic reaction to chlorthalidone, sulfa drugs, or to other diuretic medicines. You should not use this medicine if you are unable to urinate. How to Use This Medicine:  
Tablet · Your doctor will tell you how much of this medicine to take and how often. Do not take more medicine or take it more often than your doctor tells you to. · Carefully follow your doctor's instructions about any special diet.  You may need to eat foods that are high in potassium (such as oranges or bananas) to prevent potassium loss while you are using this medicine. If a dose is missed: · If you miss a dose or forget to take your medicine, take it as soon as you can. If it is almost time for your next dose, wait until then to take the medicine and skip the missed dose. · Do not use extra medicine to make up for a missed dose. How to Store and Dispose of This Medicine: · Store the medicine at room temperature, away from heat, moisture, and direct light. · Keep all medicine out of the reach of children and never share your medicine with anyone. Drugs and Foods to Avoid: Ask your doctor or pharmacist before using any other medicine, including over-the-counter medicines, vitamins, and herbal products. · Make sure your doctor knows if you are also using bepridil (Vascor®), cholestyramine [place]), colestipol (Colestid®), digoxin (Lanoxin®), lithium, steroids (such as cortisone, prednisone), or low-salt milk. · Do not drink alcohol while you are using this medicine. Warnings While Using This Medicine: · Make sure your doctor knows if you are pregnant or breastfeeding, or if you have liver disease, kidney disease, diabetes, gout, pancreatitis, or lupus. · This medicine may make you dizzy. Avoid driving, using machines, or doing anything else that could be dangerous if you are not alert. · This medicine may make your skin more sensitive to sunlight. Use a sunscreen when you are outdoors. Avoid sunlamps and tanning beds. Possible Side Effects While Using This Medicine:  
Call your doctor right away if you notice any of these side effects: · Blood in urine or stools · Confusion, weakness, irregular heartbeat, shortness of breath, numbness or tingling in hands, feet, or lips · Dry mouth, increased thirst, muscle cramps, nausea or vomiting · Fever chills, cough, hoarseness · Problems urinating, pain in side or lower back · Skin rash or itching · Unusual bleeding or bruising · Yellow eyes or skin If you notice these less serious side effects, talk with your doctor: · Loss of appetite · Problems having sex · Mild diarrhea or stomach upset If you notice other side effects that you think are caused by this medicine, tell your doctor. Call your doctor for medical advice about side effects. You may report side effects to FDA at 7-535-HMU-1435 © 2017 [address] Information is for End User's use only and may not be sold, redistributed or otherwise used for commercial purposes. The above information is an  only. It is not intended as medical advice for individual conditions or treatments. Talk to your doctor, nurse or pharmacist before following any medical regimen to see if it is safe and effective for you. Testosterone (Aveed, Delatestryl, Depo-Testosterone, Novaplus Depo-Testosterone) - (By injection) Why this medicine is used:  
Treats low testosterone levels. Also treats breast cancer in women and delayed puberty in male teenagers. Contact a nurse or doctor right away if you have: · Chest pain, cough, trouble breathing, dizziness, tightening of your throat, unusual sweating · Unusual bleeding, bruising, or weakness · Change in how much or how often you urinate, trouble urinating · Dark urine or pale stools, nausea, vomiting, loss of appetite, stomach pain, yellow skin or eyes · Swelling, pain, or redness in your hands, ankles, or feet Common side effects: · Acne, hoarse voice, facial hair growth (women), changes in menstrual periods · More erections than usual or erections that last a long time, swollen breasts (men) · Pain or redness where the shot was given © 2017 [address] Information is for End User's use only and may not be sold, redistributed or otherwise used for commercial purposes. Testosterone (By injection) Testosterone (suresh-TOS-ter-one) Treats low testosterone levels. Also treats breast cancer in women and delayed puberty in male teenagers. Brand Name(s): Aveed, Delatestryl, Depo-Testosterone, Depo-Testosterone Novaplus, PremierPro RX Depo-Testosterone, Testone CIK There may be other brand names for this medicine. When This Medicine Should Not Be Used: This medicine is not right for everyone. You should not receive it if you had an allergic reaction to testosterone, benzyl benzoate, or refined castor oil. A man should not receive this medicine if he has breast cancer or prostate cancer. A woman should not receive this medicine if she is pregnant or breastfeeding. How to Use This Medicine:  
Injectable · Your doctor will prescribe your exact dose and tell you how often it should be given. This medicine is given as a shot into one of your muscles. It is usually given in the buttocks. · A nurse or other health provider will give you this medicine. · This medicine should come with a Medication Guide. Ask your pharmacist for a copy if you do not have one. · Missed dose: Call your doctor or pharmacist for instructions. Drugs and Foods to Avoid: Ask your doctor or pharmacist before using any other medicine, including over-the-counter medicines, vitamins, and herbal products. · Some medicines can affect how testosterone works. Tell your doctor if you are using any of the following:  
¨ Oxyphenbutazone ¨ Blood thinner (including warfarin) ¨ Insulin or diabetes medicine that you take by mouth ¨ Steroid medicine (including dexamethasone, hydrocortisone, methylprednisolone, prednisolone, prednisone) Warnings While Using This Medicine: · It is not safe to take this medicine during pregnancy. It could harm an unborn baby. Tell your doctor right away if you become pregnant.  
· Tell your doctor if you have kidney disease, liver disease, diabetes, an enlarged prostate, heart disease, high cholesterol, lung disease, sleep apnea, or a history of heart attack or stroke. · This medicine may cause the following problems: 
¨ Serious lung reaction called pulmonary oil embolism (may be life-threatening) ¨ Increased risk of prostate cancer ¨ Increased numbers of red blood cells ¨ Blood clot in your leg or lung ¨ Slow growth in children ¨ Increased risk of heart attack or stroke ¨ Lower sperm count (with large doses) · This medicine can be habit-forming. Do not use more than your prescribed dose. Call your doctor if you think your medicine is not working. · Your doctor will do lab tests at regular visits to check on the effects of this medicine. Keep all appointments. Possible Side Effects While Using This Medicine:  
Call your doctor right away if you notice any of these side effects: · Allergic reaction: Itching or hives, swelling in your face or hands, swelling or tingling in your mouth or throat, chest tightness, trouble breathing · Change in how much or how often you urinate, trouble urinating · Chest pain, cough, trouble breathing, dizziness, tightening of your throat, unusual sweating · Dark urine or pale stools, nausea, vomiting, loss of appetite, stomach pain, yellow skin or eyes · Pain, redness, or swelling in your arm or leg · Swelling in your hands, ankles, or feet · Unusual bleeding, bruising, or weakness If you notice these less serious side effects, talk with your doctor: · Acne, hoarse voice, facial hair growth (women) · Changes in menstrual periods · More erections than usual or erections that last a long time · Pain or redness where the shot was given · Swollen breasts (men) If you notice other side effects that you think are caused by this medicine, tell your doctor. Call your doctor for medical advice about side effects. You may report side effects to FDA at 0-968-FDA-3265 © 2017 [address] Information is for End User's use only and may not be sold, redistributed or otherwise used for commercial purposes. The above information is an  only. It is not intended as medical advice for individual conditions or treatments. Talk to your doctor, nurse or pharmacist before following any medical regimen to see if it is safe and effective for you. Introducing [facility]! [facility] introduces Plivo patient portal. Now you can access parts of your medical record, email your doctor's office, and request medication refills online. 1. In your internet browser, go to [email or web address] 2. Click on the First Time User? Click Here link in the Sign In box. You will see the New Member Sign Up page. 3. Enter your Plivo Access Code exactly as it appears below. You will not need to use this code after youve completed the sign-up process. If you do not sign up before the expiration date, you must request a new code. · Plivo Access Code: [email or web address] Expires: 5/6/2018  4:57 PM 
 
4. Enter the last four digits of your Social Security Number (xxxx) and Date of Birth (mm/dd/yyyy) as indicated and click Submit. You will be taken to the next sign-up page. 5. Create a Plivo ID. This will be your Plivo login ID and cannot be changed, so think of one that is secure and easy to remember. 6. Create a Plivo password. You can change your password at any time. 7. Enter your Password Reset Question and Answer. This can be used at a later time if you forget your password. 8. Enter your e-mail address. You will receive e-mail notification when new information is available in [address] 9. Click Sign Up. You can now view and download portions of your medical record. 10. Click the Download Summary menu link to download a portable copy of your medical information.  
 
If you have questions, please visit the Frequently Asked Questions section of the Music Dealers. Remember, [email or web address] is NOT to be used for urgent needs. For medical emergencies, dial 911. Now available from your iPhone and Android! Please provide this summary of care documentation to your next provider. Your primary care clinician is listed as Brittney Lopez. If you have any questions after today's visit, please call [phone].

## 2018-03-19 NOTE — PROGRESS NOTES
History and Physical    Patient: Sobeida Fuentes MRN: [id number]  SSN: [id number]    YOB: 1975  Age: 43 y.o. Sex: male      Subjective:      Sobeida Fuentes is a 43 y.o. male who is here for follow up to review his labs. He is here with Lili Billy his wife. The patient has been checking his blood pressures. He has had readings in the 150 to 160 mmHg range. Additionally, the patient mentions that he has not used the Viagra that was ordered. Visit from 2/22/2018  Patient is here to review labs and to discuss his blood pressure readings. He mentions that he goes to the gym about 3-4 times a week. He has systolic BP readings that range from 268 systolic to 904 systolic. 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 He mentions that he gets about 4-6 hours of sleep a night. Visit from 2/5/2018  Patient is here to establish care. The patient has been out of touch with his PCP Dr. Haley Dupont with [facility]. 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 He states that he was about 25years of age when he was diagnosed with hypertension. The patient was later prescribed losartan by his doctor. The patient mentions that he was not that consistent in taking his meds of late. The patient denies any headaches or blurred vision since being off of his medications. He also states that he never had any side effects from his medications. The patient mentions that he has been treated for low testosterone. He states that he was previously treated with Androgel. He states that is interested in starting back on testosterone. He states that he is constantly fatigued and has a problem concentrating. He states that the last time he had T therapy was around 2016. Past Medical History:   Diagnosis Date    Hypercholesterolemia     Hypertension      No past surgical history on file.      Denies Family History   Problem Relation Age of Onset    Cancer Father      Rectal (diagnosed 5-7 years ago)    [facility] Heart Disease Father     Heart Attack Father     Stroke Father     Hypertension Father     Hypertension Sister      Social History   Substance Use Topics    Smoking status: Current Every Day Smoker (3 cigarettes a day)    Smokeless tobacco: Never Used    Alcohol use 10.8 oz/week     18 Cans of beer per week (12-18 cans a week of alcohol)    -   - Denies illicit drugs   - [facility]12[facility]13 [facility]        Prior to Admission medications    Medication Sig Start Date End Date Taking? Authorizing Provider   sildenafil citrate (VIAGRA) 50 mg tablet Take 1 Tab by mouth as needed. 3/13/18   Manish-Grzegorz B Prosper, DO   ergocalciferol (ERGOCALCIFEROL) 50,000 unit capsule Take 1 Cap by mouth every seven (7) days. Indications: VITAMIN D DEFICIENCY (HIGH DOSE THERAPY) 2/22/18   Manish-Grzegorz B Prosper, DO   pravastatin (PRAVACHOL) 20 mg tablet Take 1 Tab by mouth nightly. Indications: hypercholesterolemia 2/22/18   Manish-Grzegorz B Prosper, DO   losartan (COZAAR) 50 mg tablet Take  by mouth daily. Historical Provider        No Known Allergies    Review of Systems:  Pertinent ROS noted in HPI     Objective:     Visit Vitals    BP (!) 145/96 (BP 1 Location: Left arm, BP Patient Position: Sitting)    Pulse 72    Temp 98.2 °F (36.8 °C) (Oral)    Resp 18    Ht 6' (1.829 m)    Wt 272 lb (123.4 kg)    SpO2 97%    BMI 36.89 kg/m2       Physical Exam:  GENERAL: alert, cooperative, no distress, appears stated age, mildly obese  EYE: conjunctivae/corneas clear. PERRL, EOM's intact. Fundi benign  LYMPHATIC: Cervical, supraclavicular, and axillary nodes normal.   THROAT & NECK: normal and no erythema or exudates noted. LUNG: clear to auscultation bilaterally  HEART: regular rate and rhythm, S1, S2 normal, no murmur, click, rub or gallop  ABDOMEN: soft, non-tender.  Bowel sounds normal. No masses,  no organomegaly, abnormal findings:  obese  EXTREMITIES:  extremities normal, atraumatic, no cyanosis or edema  SKIN: Normal.      Labs:     Lab Results   Component Value Date/Time    WBC 7.0 02/06/2018 08:26 AM    HGB 15.2 02/06/2018 08:26 AM    HCT 46.1 02/06/2018 08:26 AM    PLATELET 880 10/80/1214 08:26 AM    MCV 89.5 02/06/2018 08:26 AM     Lab Results   Component Value Date/Time    Sodium 141 02/06/2018 08:26 AM    Potassium 4.5 02/06/2018 08:26 AM    Chloride 106 02/06/2018 08:26 AM    CO2 29 02/06/2018 08:26 AM    Anion gap 6 02/06/2018 08:26 AM    Glucose 94 02/06/2018 08:26 AM    BUN 14 02/06/2018 08:26 AM    Creatinine 1.04 02/06/2018 08:26 AM    BUN/Creatinine ratio 13 02/06/2018 08:26 AM    GFR est AA >60 02/06/2018 08:26 AM    GFR est non-AA >60 02/06/2018 08:26 AM    Calcium 9.0 02/06/2018 08:26 AM    Bilirubin, total 0.3 02/06/2018 08:26 AM    AST (SGOT) 26 02/06/2018 08:26 AM    Alk. phosphatase 51 02/06/2018 08:26 AM    Protein, total 8.1 02/06/2018 08:26 AM    Albumin 4.0 02/06/2018 08:26 AM    Globulin 4.1 (H) 02/06/2018 08:26 AM    A-G Ratio 1.0 02/06/2018 08:26 AM    ALT (SGPT) 38 02/06/2018 08:26 AM     Lab Results   Component Value Date/Time    TSH 2.40 02/06/2018 08:26 AM     Lab Results   Component Value Date/Time    Cholesterol, total 249 (H) 02/06/2018 08:26 AM    HDL Cholesterol 35 (L) 02/06/2018 08:26 AM    LDL, calculated 158.2 (H) 02/06/2018 08:26 AM    VLDL, calculated 55.8 02/06/2018 08:26 AM    Triglyceride 279 (H) 02/06/2018 08:26 AM    CHOL/HDL Ratio 7.1 (H) 02/06/2018 08:26 AM     Lab Results   Component Value Date/Time    CEA <0.5 02/06/2018 08:26 AM             Assessment:     1.) Essential Hypertension: Patient ordered chlorthalidone for maintenance. 2.) History of Hypogonadism: Follow up testosterone labs were low. I will get a PSA level before starting the patient on injectable testosterone therapy. I personally reviewed and summarized all labs with the patient.  I also explained rationale for ordered medications. This encounter including interview, exam, evaluation and discussion/ counseling was approximately 25 minutes. Patient will return in approximately 1 month for follow up on medications and lab results.        Plan:     Orders Placed This Encounter    PSA SCREENING ()    chlorthalidone (HYGROTEN) 25 mg tablet               Signed By: Brett Sher, DO     March 19, 2018

## 2018-03-19 NOTE — PATIENT INSTRUCTIONS
1.) Please get PSA before we start the Testosterone. If your numbers are fine I will start you on twice a week injectable testosterone. 2.) Use Chlorthalidone once a day for blood pressure. 3.) Return in 1 month for follow up. Chlorthalidone (By mouth)   Chlorthalidone (klor-THAL-i-done)  Treats high blood pressure and fluid retention (edema). This medicine is a diuretic (water pill). Brand Name(s):   There may be other brand names for this medicine. When This Medicine Should Not Be Used: You should not use this medicine if you have had an allergic reaction to chlorthalidone, sulfa drugs, or to other diuretic medicines. You should not use this medicine if you are unable to urinate. How to Use This Medicine:   Tablet  · Your doctor will tell you how much of this medicine to take and how often. Do not take more medicine or take it more often than your doctor tells you to. · Carefully follow your doctor's instructions about any special diet. You may need to eat foods that are high in potassium (such as oranges or bananas) to prevent potassium loss while you are using this medicine. If a dose is missed:   · If you miss a dose or forget to take your medicine, take it as soon as you can. If it is almost time for your next dose, wait until then to take the medicine and skip the missed dose. · Do not use extra medicine to make up for a missed dose. How to Store and Dispose of This Medicine:   · Store the medicine at room temperature, away from heat, moisture, and direct light. · Keep all medicine out of the reach of children and never share your medicine with anyone. Drugs and Foods to Avoid:   Ask your doctor or pharmacist before using any other medicine, including over-the-counter medicines, vitamins, and herbal products.   · Make sure your doctor knows if you are also using bepridil (Vascor®), cholestyramine Tyson Bosworth), colestipol (Colestid®), digoxin (Lanoxin®), lithium, steroids (such as cortisone, prednisone), or low-salt milk. · Do not drink alcohol while you are using this medicine. Warnings While Using This Medicine:   · Make sure your doctor knows if you are pregnant or breastfeeding, or if you have liver disease, kidney disease, diabetes, gout, pancreatitis, or lupus. · This medicine may make you dizzy. Avoid driving, using machines, or doing anything else that could be dangerous if you are not alert. · This medicine may make your skin more sensitive to sunlight. Use a sunscreen when you are outdoors. Avoid sunlamps and tanning beds. Possible Side Effects While Using This Medicine:   Call your doctor right away if you notice any of these side effects:  · Blood in urine or stools  · Confusion, weakness, irregular heartbeat, shortness of breath, numbness or tingling in hands, feet, or lips  · Dry mouth, increased thirst, muscle cramps, nausea or vomiting  · Fever chills, cough, hoarseness  · Problems urinating, pain in side or lower back  · Skin rash or itching  · Unusual bleeding or bruising  · Yellow eyes or skin  If you notice these less serious side effects, talk with your doctor:   · Loss of appetite  · Problems having sex  · Mild diarrhea or stomach upset  If you notice other side effects that you think are caused by this medicine, tell your doctor. Call your doctor for medical advice about side effects. You may report side effects to FDA at 9-386-FDA-7488  © 2017 [facility] Information is for End User's use only and may not be sold, redistributed or otherwise used for commercial purposes. The above information is an  only. It is not intended as medical advice for individual conditions or treatments. Talk to your doctor, nurse or pharmacist before following any medical regimen to see if it is safe and effective for you.    Testosterone (Aveed, Delatestryl, Depo-Testosterone, Novaplus Depo-Testosterone) - (By injection)   Why this medicine is used: Treats low testosterone levels. Also treats breast cancer in women and delayed puberty in male teenagers. Contact a nurse or doctor right away if you have:  · Chest pain, cough, trouble breathing, dizziness, tightening of your throat, unusual sweating  · Unusual bleeding, bruising, or weakness  · Change in how much or how often you urinate, trouble urinating  · Dark urine or pale stools, nausea, vomiting, loss of appetite, stomach pain, yellow skin or eyes  · Swelling, pain, or redness in your hands, ankles, or feet     Common side effects:  · Acne, hoarse voice, facial hair growth (women), changes in menstrual periods  · More erections than usual or erections that last a long time, swollen breasts (men)  · Pain or redness where the shot was given  © 2017 [address] is for End User's use only and may not be sold, redistributed or otherwise used for commercial purposes. Testosterone (By injection)   Testosterone (suresh-TOS-ter-one)  Treats low testosterone levels. Also treats breast cancer in women and delayed puberty in male teenagers. Brand Name(s): Aveed, Delatestryl, Depo-Testosterone, Depo-Testosterone Novaplus, PremierPro RX Depo-Testosterone, Testone CIK   There may be other brand names for this medicine. When This Medicine Should Not Be Used: This medicine is not right for everyone. You should not receive it if you had an allergic reaction to testosterone, benzyl benzoate, or refined castor oil. A man should not receive this medicine if he has breast cancer or prostate cancer. A woman should not receive this medicine if she is pregnant or breastfeeding. How to Use This Medicine:   Injectable  · Your doctor will prescribe your exact dose and tell you how often it should be given. This medicine is given as a shot into one of your muscles. It is usually given in the buttocks. · A nurse or other health provider will give you this medicine.   · This medicine should come with a Medication Guide. Ask your pharmacist for a copy if you do not have one. · Missed dose: Call your doctor or pharmacist for instructions. Drugs and Foods to Avoid:   Ask your doctor or pharmacist before using any other medicine, including over-the-counter medicines, vitamins, and herbal products. · Some medicines can affect how testosterone works. Tell your doctor if you are using any of the following:   ¨ Oxyphenbutazone  ¨ Blood thinner (including warfarin)  ¨ Insulin or diabetes medicine that you take by mouth  ¨ Steroid medicine (including dexamethasone, hydrocortisone, methylprednisolone, prednisolone, prednisone)  Warnings While Using This Medicine:   · It is not safe to take this medicine during pregnancy. It could harm an unborn baby. Tell your doctor right away if you become pregnant. · Tell your doctor if you have kidney disease, liver disease, diabetes, an enlarged prostate, heart disease, high cholesterol, lung disease, sleep apnea, or a history of heart attack or stroke. · This medicine may cause the following problems:  ¨ Serious lung reaction called pulmonary oil embolism (may be life-threatening)  ¨ Increased risk of prostate cancer  ¨ Increased numbers of red blood cells  ¨ Blood clot in your leg or lung  ¨ Slow growth in children  ¨ Increased risk of heart attack or stroke  ¨ Lower sperm count (with large doses)  · This medicine can be habit-forming. Do not use more than your prescribed dose. Call your doctor if you think your medicine is not working. · Your doctor will do lab tests at regular visits to check on the effects of this medicine. Keep all appointments.   Possible Side Effects While Using This Medicine:   Call your doctor right away if you notice any of these side effects:  · Allergic reaction: Itching or hives, swelling in your face or hands, swelling or tingling in your mouth or throat, chest tightness, trouble breathing  · Change in how much or how often you urinate, trouble urinating  · Chest pain, cough, trouble breathing, dizziness, tightening of your throat, unusual sweating  · Dark urine or pale stools, nausea, vomiting, loss of appetite, stomach pain, yellow skin or eyes  · Pain, redness, or swelling in your arm or leg  · Swelling in your hands, ankles, or feet  · Unusual bleeding, bruising, or weakness  If you notice these less serious side effects, talk with your doctor:   · Acne, hoarse voice, facial hair growth (women)  · Changes in menstrual periods  · More erections than usual or erections that last a long time  · Pain or redness where the shot was given  · Swollen breasts (men)  If you notice other side effects that you think are caused by this medicine, tell your doctor. Call your doctor for medical advice about side effects. You may report side effects to FDA at 6-226-FDA-1804  © 2017 [facility] Information is for End User's use only and may not be sold, redistributed or otherwise used for commercial purposes. The above information is an  only. It is not intended as medical advice for individual conditions or treatments. Talk to your doctor, nurse or pharmacist before following any medical regimen to see if it is safe and effective for you.

## 2018-03-22 ENCOUNTER — HOSPITAL ENCOUNTER (OUTPATIENT)
Dept: LAB | Age: 43
Discharge: HOME | End: 2018-03-22

## 2018-03-22 PROCEDURE — 99001 SPECIMEN HANDLING PT-LAB: CPT | Performed by: INTERNAL MEDICINE

## 2018-03-23 LAB — PSA SERPL-MCNC: 0.7 NG/ML (ref 0–4)

## 2018-03-27 ENCOUNTER — TELEPHONE (OUTPATIENT)
Dept: FAMILY MEDICINE CLINIC | Age: 43
End: 2018-03-27

## 2018-04-03 NOTE — TELEPHONE ENCOUNTER
Pt re calling for medication--prior auth? ?--pt very upset,ask for nurse to call them.  Christopher Pontiff this has been from 03-26-18

## 2018-04-04 NOTE — TELEPHONE ENCOUNTER
Prior authorization request has been submitted to the insurance company and once a determination has been made the patient will be notified.

## 2024-01-19 NOTE — PATIENT INSTRUCTIONS
Please contact our office if you have any questions about your visit today. 1.) Start on Pravastatin for cholesterol. 2.) Come back next week for the second testosterone test.     3.) Use Vitamin D once a week for Vitamin D deficiency. 4.) Return in 3 weeks for follow up. Try and get the OMRON version of the blood pressure machine. Try and get a large cuff machine. Statins: Care Instructions  Your Care Instructions    Statins are medicines that lower your cholesterol and your risk for a heart attack and stroke. Cholesterol is a type of fat in your blood. If you have too much cholesterol, it can build up in blood vessels. This raises your risk of heart disease, heart attack, and stroke. Statins lower cholesterol by blocking how much your body makes. This prevents cholesterol from building up in your blood vessels. This is called hardening of the arteries. It is the starting point for some heart and blood flow problems, such as heart disease. Statins may also reduce inflammation around the buildup (called plaque). This can lower the risk that the plaque will break apart and lead to a heart attack or stroke. A heart-healthy lifestyle is important for lowering your risk whether you take statins or not. This includes eating healthy foods, being active, staying at a healthy weight, and not smoking. You must take statins regularly for them to work well. If you stop, your cholesterol and your risk will go back up. Examples of statins include:  · Atorvastatin (Lipitor). · Lovastatin (Mevacor). · Pravastatin (Pravachol). · Simvastatin (Zocor). Statins interact with many medicines. So tell your doctor all of the other medicines that you take. These include prescription medicines, over-the-counter medicines, dietary supplements, and herbal products. Follow-up care is a key part of your treatment and safety.  Be sure to make and go to all appointments, and call your doctor if you are having problems. It's also a good idea to know your test results and keep a list of the medicines you take. How can you care for yourself at home? · Take statins exactly as your doctor tells you. High cholesterol has no symptoms. So it is easy to forget to take the pills. Try to make a system that reminds you to take them. · Do not take two or more medicines at the same time unless the doctor told you to. Statins can interact with other medicines. · Always tell your doctor if you think you are having a side effect. If side effects are a problem with one medicine, a different one may be used. · Keep making the lifestyle changes your doctor suggests. Eat heart-healthy foods, be active, don't smoke, and stay at a healthy weight. · Talk to your doctor about avoiding grapefruit juice if you take statins. Grapefruit juice can raise the level of this medicine in your blood. This could increase side effects. When should you call for help? Watch closely for changes in your health, and be sure to contact your doctor if:  ? · You think you are having problems with your medicine. ? · You have aches or muscle pain. Where can you learn more? Go to http://yonatan-rosita.info/. Enter R358 in the search box to learn more about \"Statins: Care Instructions. \"  Current as of: September 21, 2016  Content Version: 11.4  © 4709-1288 [employer]. Care instructions adapted under license by [employer] (which disclaims liability or warranty for this information). If you have questions about a medical condition or this instruction, always ask your healthcare professional. [address] any warranty or liability for your use of this information. Never